# Patient Record
Sex: FEMALE | Race: WHITE | Employment: STUDENT | URBAN - METROPOLITAN AREA
[De-identification: names, ages, dates, MRNs, and addresses within clinical notes are randomized per-mention and may not be internally consistent; named-entity substitution may affect disease eponyms.]

---
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
## 2023-07-24 NOTE — MR AVS SNAPSHOT
Pt carried to peds 52 by mother. Gown provided. Call light introduced. All questions and concerns addressed. Chart up for ERP.   Visit Information Date & Time Provider Department Dept. Phone Encounter #  
 10/30/2017  1:00 PM Luis Mora, [address] [facility] [place] [phone] [id number] Follow-up Instructions Return if symptoms worsen or fail to improve. Upcoming Health Maintenance Date Due Hepatitis B Peds Age 0-18 (1 of 3 - Primary Series) 2001 IPV Peds Age 0-18 (1 of 4 - All-IPV Series) 2001 Hepatitis A Peds Age 1-18 (1 of 2 - Standard Series) 7/9/2002 MMR Peds Age 1-18 (1 of 2) 7/9/2002 DTaP/Tdap/Td series (1 - Tdap) 7/9/2008 HPV AGE 9Y-26Y (1 of 3 - Female 3 Dose Series) 7/9/2012 Varicella Peds Age 1-18 (1 of 2 - 2 Dose Adolescent Series) 7/9/2014 MCV through Age 25 (1 of 1) 7/9/2017 INFLUENZA AGE 9 TO ADULT 8/1/2017 Allergies as of 10/30/2017  Review Complete On: 10/30/2017 By: Luis Mora, DO No Known Allergies Current Immunizations  Never Reviewed No immunizations on file. Not reviewed this visit You Were Diagnosed With   
  
 Codes Comments Acute midline low back pain without sciatica    -  Primary ICD-10-CM: M54.5 ICD-9-CM: 724.2 Neck strain, subsequent encounter     ICD-10-CM: S16. 1XXD ICD-9-CM: V58.89, 847.0 Vitals BP Pulse Temp Resp Height(growth percentile) Weight(growth percentile) 128/65 (95 %/ 47 %)* (BP 1 Location: Right arm, BP Patient Position: Sitting) 67 96.9 °F (36.1 °C) (Oral) 17 5' 3\" (1.6 m) (34 %, Z= -0.41) 176 lb (79.8 kg) (96 %, Z= 1.70) LMP SpO2 BMI OB Status Smoking Status 10/23/2017 (Approximate) 100% 31.18 kg/m2 (97 %, Z= 1.85) Having regular periods Never Smoker *BP percentiles are based on NHBPEP's 4th Report Growth percentiles are based on CDC 2-20 Years data. Vitals History BMI and BSA Data Body Mass Index Body Surface Area  
 31.18 kg/m 2 1.88 m 2 Preferred Pharmacy Pharmacy Name Phone [facility], [address] [phone] Your Updated Medication List  
  
   
This list is accurate as of: 10/30/17  1:14 PM.  Always use your most recent med list.  
  
  
  
  
 CAMRESE LO 0.10 mg-20 mcg (84)/10 mcg (7) 3mpk Generic drug:  [facility] [place] HYDROcodone-acetaminophen 5-325 mg per tablet Commonly known as:  John Herrlich Take 1 Tab by mouth every eight (8) hours as needed for Pain. Max Daily Amount: 3 Tabs. MOTRIN  mg tablet Generic drug:  ibuprofen Take  by mouth. MYORISAN 40 mg capsule Generic drug:  ISOtretinoin  
  
 naproxen 500 mg tablet Commonly known as:  NAPROSYN Take 1 tablet by mouth two (2) times daily (with meals). omeprazole 40 mg capsule Commonly known as:  PRILOSEC  
  
 VITAMIN D2 50,000 unit capsule Generic drug:  ergocalciferol ZANTAC PO Take  by mouth. Follow-up Instructions Return if symptoms worsen or fail to improve. To-Do List   
 11/06/2017 3:00 PM  
  Appointment with Christal Cornejo DO at [address] [place] [address], [address] and [facility] - [facility] ([phone]) Introducing [facility]! Dear Parent or Guardian, Thank you for requesting a [email or web address] account for your child. With [email or web address], you can view your childs hospital or ER discharge instructions, current allergies, immunizations and much more. In order to access your childs information, we require a signed consent on file. Please see the [facility] department or call [phone] for instructions on completing a [email or web address] Proxy request.   
Additional Information If you have questions, please visit the Frequently Asked Questions section of the [email or web address] website at [email or web address] Remember, [email or web address] is NOT to be used for urgent needs. For medical emergencies, dial 911. Now available from your iPhone and Android! Please provide this summary of care documentation to your next provider. Your primary care clinician is listed as Niraj Nicolas. If you have any questions after today's visit, please call [phone].